# Patient Record
Sex: FEMALE | Race: NATIVE HAWAIIAN OR OTHER PACIFIC ISLANDER | NOT HISPANIC OR LATINO | URBAN - METROPOLITAN AREA
[De-identification: names, ages, dates, MRNs, and addresses within clinical notes are randomized per-mention and may not be internally consistent; named-entity substitution may affect disease eponyms.]

---

## 2018-04-03 ENCOUNTER — OFFICE VISIT - HEALTHEAST (OUTPATIENT)
Dept: INTERNAL MEDICINE | Facility: CLINIC | Age: 28
End: 2018-04-03

## 2018-04-03 DIAGNOSIS — J06.9 VIRAL UPPER RESPIRATORY TRACT INFECTION: ICD-10-CM

## 2018-04-03 ASSESSMENT — MIFFLIN-ST. JEOR: SCORE: 1352.4

## 2021-06-01 VITALS — WEIGHT: 137.25 LBS | BODY MASS INDEX: 22.87 KG/M2 | HEIGHT: 65 IN

## 2021-06-17 NOTE — PROGRESS NOTES
[facility] Follow Up Note    Assessment/Plan:    1. Viral upper respiratory tract infection  Discussed that her symptoms are most likely viral in origin.  Not consistent with strep.  She could have had a low-grade influenza but no point in testing or treating since it has been a week.  For now I believe she just needs to recuperate at home for another 2-3 days a note for work was provided.  Recommended that she does [place] pot twice a day and can also try nasal steroid spray.  If her symptoms get worse or fail to improve in the next 3 days recommended to call me back.    Clarissa Varghese MD    Chief Complaint:  Chief Complaint   Patient presents with     [facility]     URI     gradual onset over past 5 days       History of Present Illness:  Danielito is a 27 y.o. female who is healthy, she is currently here for upper respiratory infection.  His symptoms started 6 days ago.  She developed mild sore throat, muscle aches, nasal congestion which progressed into the cough.  Currently she denies any worsening symptoms but went to work yesterday and just felt very tired afterwards.  She denies any fevers or chills.  No sinus pain or ear pain.  No pleurisy or shortness of breath.  No GI symptoms.  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    Review of Systems:  A comprehensive review of systems was performed and was otherwise negative.  Today she did not go to work and feeling a little better.  Her appetite is returning    PFSH:  Social History: Reviewed  History   Smoking Status     Never Smoker   Smokeless Tobacco     Never Used     Social History     Social History Narrative     No narrative on file       Past History: Reviewed  Current Outpatient Prescriptions   Medication Sig Dispense Refill     guaiFENesin-dextromethorphan (MUCINEX DM) 600-30 mg Tb12 Take 1 tablet by mouth every 12 (twelve) hours.       cholecalciferol, vitamin D3, 1,000 unit capsule Take 4,000 mg by mouth.    "    VITAMIN B COMPLEX ORAL Take 1,000 mg by mouth.       No current facility-administered medications for this visit.        Physical Exam:    Vitals:    04/03/18 1148   BP: 124/74   Patient Site: Left Arm   Patient Position: Sitting   Cuff Size: Adult Regular   Pulse: 74   Resp: 16   Temp: 97.9  F (36.6  C)   TempSrc: Tympanic   SpO2: 97%   Weight: 137 lb 4 oz (62.3 kg)   Height: 5' 5.25\" (1.657 m)     Wt Readings from Last 3 Encounters:   04/03/18 137 lb 4 oz (62.3 kg)     Body mass index is 22.66 kg/(m^2).    Constitutional:  Reveals a pleasant young female.  Vitals:  Per nursing notes.  HEENT:No cervical LAD, no thyromegaly,  conjunctiva is pink, no scleral icterus, TMs are visualized and normal bl, oropharynx is clear, no exudates, no sinus tenderness to palpation.  Cardiac:  Regular rate and rhythm,no murmurs, rubs, or gallops. Carotids without bruits. Legs without edema. Palpation of the radial pulse regular.  Lungs: Clear to auscultation bl.  Respiratory effort normal.  No wheezes or rales, no mucus trapping.  Abdomen:positive BS, soft, nontender, nondistended.  No hepato-splenomagaly  Psychiatric: affect appropriate, memory intact.     Data Review:    Analysis and Summary of Old Records (2): Yes care everywhere    Records Requested (1): No    Other History Summarized (from other people in the room) (2): No    Radiology Tests Summarized (XRAY/CT/MRI/DXA) (1): No    Labs Reviewed (1): No    Medicine Tests Reviewed (EKG/ECHO/COLONOSCOPY/EGD) (1): No    Independent Review of EKG or X-RAY (2): No    "